# Patient Record
Sex: MALE | Race: WHITE | Employment: PART TIME | URBAN - METROPOLITAN AREA
[De-identification: names, ages, dates, MRNs, and addresses within clinical notes are randomized per-mention and may not be internally consistent; named-entity substitution may affect disease eponyms.]

---
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
## 2024-06-06 DIAGNOSIS — F41.9 ANXIETY AND DEPRESSION: ICD-10-CM

## 2024-06-06 DIAGNOSIS — F32.A ANXIETY AND DEPRESSION: ICD-10-CM

## 2024-06-06 RX ORDER — SERTRALINE HYDROCHLORIDE 100 MG/1
100 TABLET, FILM COATED ORAL DAILY
COMMUNITY
Start: 2024-06-06

## 2024-06-10 RX ORDER — SERTRALINE HYDROCHLORIDE 100 MG/1
100 TABLET, FILM COATED ORAL DAILY
Qty: 5 TABLET | COMMUNITY
Start: 2024-06-10 | End: 2024-06-12

## 2024-06-12 ENCOUNTER — OFFICE VISIT (OUTPATIENT)
Dept: FAMILY MEDICINE | Facility: CLINIC | Age: 19
End: 2024-06-12

## 2024-06-12 VITALS
RESPIRATION RATE: 20 BRPM | HEART RATE: 68 BPM | WEIGHT: 177 LBS | SYSTOLIC BLOOD PRESSURE: 108 MMHG | DIASTOLIC BLOOD PRESSURE: 68 MMHG | TEMPERATURE: 97.6 F | BODY MASS INDEX: 25.4 KG/M2 | OXYGEN SATURATION: 98 %

## 2024-06-12 DIAGNOSIS — Z23 NEED FOR VACCINATION: ICD-10-CM

## 2024-06-12 DIAGNOSIS — F41.9 ANXIETY AND DEPRESSION: Primary | ICD-10-CM

## 2024-06-12 DIAGNOSIS — F32.A ANXIETY AND DEPRESSION: Primary | ICD-10-CM

## 2024-06-12 PROCEDURE — 90715 TDAP VACCINE 7 YRS/> IM: CPT | Performed by: STUDENT IN AN ORGANIZED HEALTH CARE EDUCATION/TRAINING PROGRAM

## 2024-06-12 PROCEDURE — 90471 IMMUNIZATION ADMIN: CPT | Performed by: STUDENT IN AN ORGANIZED HEALTH CARE EDUCATION/TRAINING PROGRAM

## 2024-06-12 PROCEDURE — 99213 OFFICE O/P EST LOW 20 MIN: CPT | Mod: 25 | Performed by: STUDENT IN AN ORGANIZED HEALTH CARE EDUCATION/TRAINING PROGRAM

## 2024-06-12 RX ORDER — SERTRALINE HYDROCHLORIDE 100 MG/1
100 TABLET, FILM COATED ORAL DAILY
Qty: 90 TABLET | Refills: 3 | Status: SHIPPED | OUTPATIENT
Start: 2024-06-12

## 2024-06-12 RX ORDER — SERTRALINE HYDROCHLORIDE 100 MG/1
100 TABLET, FILM COATED ORAL DAILY
Qty: 90 TABLET | Refills: 1 | Status: CANCELLED | OUTPATIENT
Start: 2024-06-12

## 2024-06-12 ASSESSMENT — ANXIETY QUESTIONNAIRES
5. BEING SO RESTLESS THAT IT IS HARD TO SIT STILL: NOT AT ALL
GAD7 TOTAL SCORE: 1
7. FEELING AFRAID AS IF SOMETHING AWFUL MIGHT HAPPEN: NOT AT ALL
2. NOT BEING ABLE TO STOP OR CONTROL WORRYING: NOT AT ALL
1. FEELING NERVOUS, ANXIOUS, OR ON EDGE: SEVERAL DAYS
3. WORRYING TOO MUCH ABOUT DIFFERENT THINGS: NOT AT ALL
GAD7 TOTAL SCORE: 1
6. BECOMING EASILY ANNOYED OR IRRITABLE: NOT AT ALL
IF YOU CHECKED OFF ANY PROBLEMS ON THIS QUESTIONNAIRE, HOW DIFFICULT HAVE THESE PROBLEMS MADE IT FOR YOU TO DO YOUR WORK, TAKE CARE OF THINGS AT HOME, OR GET ALONG WITH OTHER PEOPLE: NOT DIFFICULT AT ALL

## 2024-06-12 ASSESSMENT — PATIENT HEALTH QUESTIONNAIRE - PHQ9
5. POOR APPETITE OR OVEREATING: NOT AT ALL
SUM OF ALL RESPONSES TO PHQ QUESTIONS 1-9: 7

## 2024-06-12 NOTE — PROGRESS NOTES
Assessment & Plan       ICD-10-CM    1. Anxiety and depression  F41.9 sertraline (ZOLOFT) 100 MG tablet    F32.A       2. Need for vaccination  Z23 TDAP VACCINE (Adacel, Boostrix)  [[id number]]           Patient Instructions   Continue current dose    Tetanus shot today    Follow-up anytime if you think meds need to be adjusted or any new concerns      CPE at patient convenience,  reviewed today      Judd Reynolds MD, [facility]      Subjective     Federico Christina is a 18 year old male who presents to clinic today for the following health issues:    HPI   Chief Complaint   Patient presents with    Recheck Medication     Non fasting medication check and review, does have some concerns with current dose and wants to talk about future plans, leaving for college soon       Depression   How are you doing with your depression since your last visit? No change, recently graduated high school. Looking for second job. Still interested in community college this fall.   Are you having other symptoms that might be associated with depression? No  Have you had a significant life event?  No   Are you feeling anxious or having panic attacks?   No  Do you have any concerns with your use of alcohol or other drugs? No    Social History     Tobacco Use    Smoking status: Never    Smokeless tobacco: Never   Substance Use Topics    Alcohol use: Not Currently    Drug use: Not Currently         4/3/2023     5:55 PM 6/12/2024     1:20 PM   PHQ   PHQ-9 Total Score 10 7   Q9: Thoughts of better off dead/self-harm past 2 weeks Not at all Not at all         4/3/2023     5:55 PM 6/12/2024     1:20 PM   LYNNETTE-7 SCORE   Total Score 1 1           Objective    /68 (BP Location: Right arm, Patient Position: Sitting, Cuff Size: Adult Large)   Pulse 68   Temp 97.6  F (36.4  C) (Temporal)   Resp 20   Wt 80.3 kg (177 lb)   SpO2 98%   BMI 25.40 kg/m    Body mass index is 25.4 kg/m .  Alert, NAD  NC/AT  Sclerae  anicteric  Regular  Resp nonlabored  Skin warm and dry  No focal neuro deficits. Speech intact. Normal gait.  Appropriate affect, good insight, no si

## 2024-06-12 NOTE — NURSING NOTE
Chief Complaint   Patient presents with    Recheck Medication     Non fasting medication check and review, does have some concerns with current dose and wants to talk about future plans, leaving for college soon      Pre-visit Screening:  Immunizations:  not up to date - due for tdap  Colonoscopy:  na  Mammogram: na  Asthma Action Test/Plan:  na  PHQ9:  given today   GAD7:  given today   Questioned patient about current smoking habits Pt. has never smoked.  Ok to leave detailed message on voice mail for today's visit only yes, phone # [phone]

## 2024-06-12 NOTE — PATIENT INSTRUCTIONS
Continue current dose    Tetanus shot today    Follow-up anytime if you think meds need to be adjusted or any new concerns

## 2024-06-17 PROBLEM — Z76.89 HEALTH CARE HOME: Status: RESOLVED | Noted: 2023-04-03 | Resolved: 2024-06-17

## 2025-01-17 ENCOUNTER — OFFICE VISIT (OUTPATIENT)
Dept: FAMILY MEDICINE | Facility: CLINIC | Age: 20
End: 2025-01-17

## 2025-01-17 VITALS
SYSTOLIC BLOOD PRESSURE: 110 MMHG | TEMPERATURE: 97 F | WEIGHT: 164 LBS | OXYGEN SATURATION: 99 % | DIASTOLIC BLOOD PRESSURE: 70 MMHG | HEART RATE: 72 BPM | BODY MASS INDEX: 23.53 KG/M2

## 2025-01-17 DIAGNOSIS — F32.A ANXIETY AND DEPRESSION: Primary | ICD-10-CM

## 2025-01-17 DIAGNOSIS — F41.9 ANXIETY AND DEPRESSION: Primary | ICD-10-CM

## 2025-01-17 DIAGNOSIS — F90.9 ATTENTION DEFICIT HYPERACTIVITY DISORDER (ADHD), UNSPECIFIED ADHD TYPE: ICD-10-CM

## 2025-01-17 DIAGNOSIS — Z23 NEED FOR VACCINATION: ICD-10-CM

## 2025-01-17 PROCEDURE — G2211 COMPLEX E/M VISIT ADD ON: HCPCS | Performed by: STUDENT IN AN ORGANIZED HEALTH CARE EDUCATION/TRAINING PROGRAM

## 2025-01-17 PROCEDURE — 90471 IMMUNIZATION ADMIN: CPT | Performed by: STUDENT IN AN ORGANIZED HEALTH CARE EDUCATION/TRAINING PROGRAM

## 2025-01-17 PROCEDURE — 99214 OFFICE O/P EST MOD 30 MIN: CPT | Mod: 25 | Performed by: STUDENT IN AN ORGANIZED HEALTH CARE EDUCATION/TRAINING PROGRAM

## 2025-01-17 PROCEDURE — 90656 IIV3 VACC NO PRSV 0.5 ML IM: CPT | Performed by: STUDENT IN AN ORGANIZED HEALTH CARE EDUCATION/TRAINING PROGRAM

## 2025-01-17 RX ORDER — ATOMOXETINE 40 MG/1
CAPSULE ORAL
Qty: 53 CAPSULE | Refills: 0 | Status: SHIPPED | OUTPATIENT
Start: 2025-01-17 | End: 2025-02-17

## 2025-01-17 ASSESSMENT — ANXIETY QUESTIONNAIRES
3. WORRYING TOO MUCH ABOUT DIFFERENT THINGS: SEVERAL DAYS
IF YOU CHECKED OFF ANY PROBLEMS ON THIS QUESTIONNAIRE, HOW DIFFICULT HAVE THESE PROBLEMS MADE IT FOR YOU TO DO YOUR WORK, TAKE CARE OF THINGS AT HOME, OR GET ALONG WITH OTHER PEOPLE: SOMEWHAT DIFFICULT
GAD7 TOTAL SCORE: 5
GAD7 TOTAL SCORE: 5
7. FEELING AFRAID AS IF SOMETHING AWFUL MIGHT HAPPEN: NOT AT ALL
2. NOT BEING ABLE TO STOP OR CONTROL WORRYING: NOT AT ALL
6. BECOMING EASILY ANNOYED OR IRRITABLE: SEVERAL DAYS
1. FEELING NERVOUS, ANXIOUS, OR ON EDGE: SEVERAL DAYS
5. BEING SO RESTLESS THAT IT IS HARD TO SIT STILL: SEVERAL DAYS

## 2025-01-17 ASSESSMENT — PATIENT HEALTH QUESTIONNAIRE - PHQ9
5. POOR APPETITE OR OVEREATING: SEVERAL DAYS
SUM OF ALL RESPONSES TO PHQ QUESTIONS 1-9: 13

## 2025-01-17 NOTE — PATIENT INSTRUCTIONS
Continue sertraline     Strattera 40mg every morning for 1 week, then if no side effects increase to 80mg daily    Follow-up in 3-4 weeks before needing refill

## 2025-01-17 NOTE — NURSING NOTE
Chief Complaint   Patient presents with    Consult     Concentration concerns. Was on ADHD meds in elementary school. Thinks that it would help with focusing in school and OCD tendencies.     Pre-visit Screening:  Immunizations:  up to date  Colonoscopy:  na  Mammogram: na  Asthma Action Test/Plan:  na  PHQ9:  na  GAD7:  na  Questioned patient about current smoking habits Pt. has never smoked.  Ok to leave detailed message on voice mail for today's visit only yes, phone # [phone] (home)

## 2025-01-17 NOTE — NURSING NOTE
Chief Complaint   Patient presents with    Consult     ADHD/OCD medication. Was on in elementary school. Thinks that it would help with focusing in school and OCD tendencies.     Pre-visit Screening:  Immunizations:  up to date  Colonoscopy:  na  Mammogram: na  Asthma Action Test/Plan:  na  PHQ9:  given today  GAD7:  given today  Questioned patient about current smoking habits Pt. has never smoked.  Ok to leave detailed message on voice mail for today's visit only yes, phone # [phone] (home)

## 2025-01-17 NOTE — PROGRESS NOTES
Assessment & Plan       ICD-10-CM    1. Anxiety and depression  F41.9 Adult Mental Health  Referral - To a [facility] Location (Use POS/Location)    F32.A       2. Attention deficit hyperactivity disorder (ADHD), unspecified ADHD type  F90.9 Adult Mental Health  Referral - To a [facility] Location (Use POS/Location)     DISCONTINUED: atomoxetine (STRATTERA) 40 MG capsule      3. Need for vaccination  Z23 INFLUENZA VACCINE, SPLIT VIRUS, TRIVALENT,PF (FLUZONE\FLUARIX)           Reviewed options of increasing sertraline, adding wellbutrin vs adhd meds, psychology referral  Patient Instructions   Continue sertraline     Strattera 40mg every morning for 1 week, then if no side effects increase to 80mg daily    Follow-up in 3-4 weeks before needing refill     Reasons to follow-up sooner or seek emergent care reviewed.     31 minutes spent on the date of the encounter doing chart review, history and exam, documentation and further activities per the note      Judd Reynolds MD, [facility]      Subjective     Federico Christina is a 19 year old male who presents to clinic today for the following health issues:    HPI   Chief Complaint   Patient presents with    Consult     Concentration concerns. Was on ADHD meds in elementary school. Thinks that it would help with focusing in school and OCD tendencies.      Started community college in the Fall, planning on eventually transferring to 4 year school. Has had difficulty concentrating with studying.    [employer]s bulk records reviewed.   Previous ADHD tx: guanfacine, metadate (irritable).   Previous anxiety tx: sertraline, hydroxzyine (too sleepy).   Prior school dx ASD.   Neuropsychology notes 2015 reviewed, conclusion: MDD with anxious features  Has seen psychologist in the past but didn't find helpful  Reports chronic OCD tendencies, often repetitively counting objects  Stays up late most nights, usually ~6 hrs  sleep/night with naps during day        4/3/2023     5:55 PM 6/12/2024     1:20 PM 1/17/2025    11:18 AM   PHQ   PHQ-9 Total Score 10 7 13   Q9: Thoughts of better off dead/self-harm past 2 weeks Not at all Not at all Not at all         4/3/2023     5:55 PM 6/12/2024     1:20 PM 1/17/2025    11:18 AM   LYNNETTE-7 SCORE   Total Score 1 1 5           Objective    /70 (BP Location: Left arm, Patient Position: Sitting, Cuff Size: Adult Regular)   Pulse 72   Temp 97  F (36.1  C) (Temporal)   Wt 74.4 kg (164 lb)   SpO2 99%   BMI 23.53 kg/m    Body mass index is 23.53 kg/m .  Alert, NAD  NC/AT  Sclerae anicteric  Regular  Resp nonlabored  Skin warm and dry  No focal neuro deficits. Speech intact. Normal gait.  Appropriate affect, no si/hi       Labs reviewed.

## 2025-02-08 DIAGNOSIS — F90.9 ATTENTION DEFICIT HYPERACTIVITY DISORDER (ADHD), UNSPECIFIED ADHD TYPE: ICD-10-CM

## 2025-02-10 RX ORDER — ATOMOXETINE 40 MG/1
CAPSULE ORAL
COMMUNITY
Start: 2025-02-10

## 2025-02-10 NOTE — TELEPHONE ENCOUNTER
Pt is requesting refills of:  Refused Prescriptions:                       Disp   Refills    atomoxetine (STRATTERA) 40 MG capsule [Pha*159 ca*1        Sig: Take 1 capsule (40 mg) by mouth daily for 7 days,           THEN 2 capsules (80 mg) daily for 23 days.    Pt was last seen 1/17/25 and advised to follow up in 3-4 weeks for refills. Pt due for OV.

## 2025-02-17 ENCOUNTER — OFFICE VISIT (OUTPATIENT)
Dept: FAMILY MEDICINE | Facility: CLINIC | Age: 20
End: 2025-02-17

## 2025-02-17 VITALS
DIASTOLIC BLOOD PRESSURE: 84 MMHG | OXYGEN SATURATION: 97 % | HEART RATE: 88 BPM | WEIGHT: 162 LBS | BODY MASS INDEX: 23.24 KG/M2 | SYSTOLIC BLOOD PRESSURE: 120 MMHG

## 2025-02-17 DIAGNOSIS — F32.A ANXIETY AND DEPRESSION: ICD-10-CM

## 2025-02-17 DIAGNOSIS — F84.0 HISTORY OF AUTISM SPECTRUM DISORDER: ICD-10-CM

## 2025-02-17 DIAGNOSIS — F90.9 ATTENTION DEFICIT HYPERACTIVITY DISORDER (ADHD), UNSPECIFIED ADHD TYPE: Primary | ICD-10-CM

## 2025-02-17 DIAGNOSIS — F41.9 ANXIETY AND DEPRESSION: ICD-10-CM

## 2025-02-17 PROCEDURE — 99214 OFFICE O/P EST MOD 30 MIN: CPT | Performed by: STUDENT IN AN ORGANIZED HEALTH CARE EDUCATION/TRAINING PROGRAM

## 2025-02-17 PROCEDURE — G2211 COMPLEX E/M VISIT ADD ON: HCPCS | Performed by: STUDENT IN AN ORGANIZED HEALTH CARE EDUCATION/TRAINING PROGRAM

## 2025-02-17 RX ORDER — ATOMOXETINE 100 MG/1
100 CAPSULE ORAL DAILY
Qty: 30 CAPSULE | Refills: 0 | Status: SHIPPED | OUTPATIENT
Start: 2025-02-17 | End: 2025-03-19

## 2025-02-17 NOTE — PROGRESS NOTES
Assessment & Plan     1. Attention deficit hyperactivity disorder (ADHD), unspecified ADHD type (Primary)  R/b increasing strattera reviewed. Pt to call or send Beam. message with update within one month. We also discussed psychiatry consult and patient in agreement, depending how long it takes to get in we may consider next steps below  - atomoxetine (STRATTERA) 100 MG capsule; Take 1 capsule (100 mg) by mouth daily.  Dispense: 30 capsule; Refill: 0  - Adult Mental Health  Referral - To a [facility] Location (Use POS/Location); Future    2. History of autism spectrum disorder  Noted, prior school dx.   3. Anxiety and depression  Continue current dose sertraline    Patient Instructions   Increase strattera to 100mg daily - please keep me updated over next few weeks    If no improvement in 1 month, can try wellbutrin or consult with psychiatry    [facility] [address]  [address]  [phone] - appt line     Reasons to follow-up sooner or seek emergent care reviewed.     Judd Reynolds MD, [facility]      Subjective     Federico Christina is a 19 year old male who presents to clinic today for the following health issues:    HPI   Chief Complaint   Patient presents with    Recheck Medication     Non fasting medication check and review for strattera, patient has been taking 2 40 mg capsules and feels that she has not noticed much of a difference, feels he wants to try and increase in dose or a different medication in general, has not noticed any alteration in any daily life activities       Started strattera 40mg 1/18, increased to 80mg after 1 week. No benefit or adverse effects appreciated yet. Mood stable, no change.        4/3/2023     5:55 PM 6/12/2024     1:20 PM 1/17/2025    11:18 AM   PHQ   PHQ-9 Total Score 10 7 13   Q9: Thoughts of better off dead/self-harm past 2 weeks Not at all Not at all Not  at all         4/3/2023     5:55 PM 6/12/2024     1:20 PM 1/17/2025    11:18 AM   LYNNETTE-7 SCORE   Total Score 1 1 5             Objective    /84 (BP Location: Left arm, Patient Position: Sitting, Cuff Size: Adult Large)   Pulse 88   Wt 73.5 kg (162 lb)   SpO2 97%   BMI 23.24 kg/m    Body mass index is 23.24 kg/m .  Alert, NAD  NC/AT  Sclerae anicteric  Regular  Resp nonlabored  Skin warm and dry  No focal neuro deficits. Speech intact. Normal gait.  Appropriate affect       Labs reviewed.

## 2025-02-17 NOTE — NURSING NOTE
Chief Complaint   Patient presents with    Recheck Medication     Non fasting medication check and review for strattera, patient has been taking 2 40 mg capsules and feels that she has not noticed much of a difference, feels he wants to try and increase in dose or a different medication in general, has not noticed any alteration in any daily life activities      Pre-visit Screening:  Immunizations:  not up to date - due for meningitis vaccine   Colonoscopy:  na  Mammogram: na  Asthma Action Test/Plan:  na  PHQ9:  na-pt states no concerns with this and sertraline is working well for him   GAD7:  na  Questioned patient about current smoking habits Pt. has never smoked.  Ok to leave detailed message on voice mail for today's visit only yes, phone # [phone] (home)

## 2025-02-17 NOTE — PATIENT INSTRUCTIONS
Increase strattera to 100mg daily - please keep me updated over next few weeks    If no improvement in 1 month, can try wellbutrin or consult with psychiatry    [facility]  [facility]  [address]  [id number] [phone] - appt line

## 2025-03-11 DIAGNOSIS — F90.9 ATTENTION DEFICIT HYPERACTIVITY DISORDER (ADHD), UNSPECIFIED ADHD TYPE: ICD-10-CM

## 2025-03-11 RX ORDER — ATOMOXETINE 100 MG/1
100 CAPSULE ORAL DAILY
COMMUNITY
Start: 2025-03-11

## 2025-03-11 NOTE — TELEPHONE ENCOUNTER
Pt is requesting refills of:  Refused Prescriptions:                       Disp   Refills    atomoxetine (STRATTERA) 100 MG capsule [Ph*90 cap*1        Sig: TAKE 1 CAPSULE BY MOUTH EVERY DAY    Pt seen 2/17/25. Pt to call or send wiseri message with update within one month for more refills. [employer] sent.

## 2025-07-01 DIAGNOSIS — F32.A ANXIETY AND DEPRESSION: ICD-10-CM

## 2025-07-01 DIAGNOSIS — F41.9 ANXIETY AND DEPRESSION: ICD-10-CM

## 2025-07-01 RX ORDER — SERTRALINE HYDROCHLORIDE 100 MG/1
100 TABLET, FILM COATED ORAL DAILY
COMMUNITY
Start: 2025-07-01

## 2025-07-01 NOTE — TELEPHONE ENCOUNTER
Federico Christina is requesting a refill of:    Refused Prescriptions:                       Disp   Refills    sertraline (ZOLOFT) 100 MG tablet [Pharmac*                Sig: TAKE 1 TABLET BY MOUTH EVERY DAY  Refused By: SEVERIANO BOLDEN  Reason for Refusal: Originating/Specialty Provider to approve  Reason for Refusal Comment: Future refills from psychiatry    Pt was referred to psychiatrist on 02/17/25

## 2025-08-05 ENCOUNTER — OFFICE VISIT (OUTPATIENT)
Dept: FAMILY MEDICINE | Facility: CLINIC | Age: 20
End: 2025-08-05

## 2025-08-05 VITALS
TEMPERATURE: 97.5 F | WEIGHT: 167.8 LBS | DIASTOLIC BLOOD PRESSURE: 70 MMHG | OXYGEN SATURATION: 98 % | SYSTOLIC BLOOD PRESSURE: 112 MMHG | HEART RATE: 62 BPM | BODY MASS INDEX: 24.08 KG/M2

## 2025-08-05 DIAGNOSIS — F90.9 ATTENTION DEFICIT HYPERACTIVITY DISORDER (ADHD), UNSPECIFIED ADHD TYPE: ICD-10-CM

## 2025-08-05 DIAGNOSIS — F32.A ANXIETY AND DEPRESSION: Primary | ICD-10-CM

## 2025-08-05 DIAGNOSIS — Z23 NEED FOR VACCINATION: ICD-10-CM

## 2025-08-05 DIAGNOSIS — F41.9 ANXIETY AND DEPRESSION: Primary | ICD-10-CM

## 2025-08-05 PROCEDURE — 90471 IMMUNIZATION ADMIN: CPT | Performed by: STUDENT IN AN ORGANIZED HEALTH CARE EDUCATION/TRAINING PROGRAM

## 2025-08-05 PROCEDURE — G2211 COMPLEX E/M VISIT ADD ON: HCPCS | Performed by: STUDENT IN AN ORGANIZED HEALTH CARE EDUCATION/TRAINING PROGRAM

## 2025-08-05 PROCEDURE — 99214 OFFICE O/P EST MOD 30 MIN: CPT | Mod: 25 | Performed by: STUDENT IN AN ORGANIZED HEALTH CARE EDUCATION/TRAINING PROGRAM

## 2025-08-05 PROCEDURE — 90651 9VHPV VACCINE 2/3 DOSE IM: CPT | Performed by: STUDENT IN AN ORGANIZED HEALTH CARE EDUCATION/TRAINING PROGRAM

## 2025-08-05 RX ORDER — LISDEXAMFETAMINE DIMESYLATE 30 MG/1
30 CAPSULE ORAL DAILY
Qty: 30 CAPSULE | Refills: 0 | Status: SHIPPED | OUTPATIENT
Start: 2025-10-04 | End: 2025-11-03

## 2025-08-05 RX ORDER — LISDEXAMFETAMINE DIMESYLATE 30 MG/1
30 CAPSULE ORAL DAILY
Qty: 30 CAPSULE | Refills: 0 | Status: SHIPPED | OUTPATIENT
Start: 2025-08-05 | End: 2025-09-04

## 2025-08-05 RX ORDER — LISDEXAMFETAMINE DIMESYLATE 30 MG/1
30 CAPSULE ORAL DAILY
Qty: 30 CAPSULE | Refills: 0 | Status: SHIPPED | OUTPATIENT
Start: 2025-09-04 | End: 2025-10-04

## 2025-08-05 RX ORDER — SERTRALINE HYDROCHLORIDE 100 MG/1
100 TABLET, FILM COATED ORAL DAILY
Qty: 90 TABLET | Refills: 3 | Status: SHIPPED | OUTPATIENT
Start: 2025-08-05

## 2025-08-05 ASSESSMENT — PATIENT HEALTH QUESTIONNAIRE - PHQ9
SUM OF ALL RESPONSES TO PHQ QUESTIONS 1-9: 11
5. POOR APPETITE OR OVEREATING: SEVERAL DAYS

## 2025-08-05 ASSESSMENT — ANXIETY QUESTIONNAIRES
7. FEELING AFRAID AS IF SOMETHING AWFUL MIGHT HAPPEN: NOT AT ALL
GAD7 TOTAL SCORE: 5
2. NOT BEING ABLE TO STOP OR CONTROL WORRYING: SEVERAL DAYS
6. BECOMING EASILY ANNOYED OR IRRITABLE: SEVERAL DAYS
3. WORRYING TOO MUCH ABOUT DIFFERENT THINGS: SEVERAL DAYS
IF YOU CHECKED OFF ANY PROBLEMS ON THIS QUESTIONNAIRE, HOW DIFFICULT HAVE THESE PROBLEMS MADE IT FOR YOU TO DO YOUR WORK, TAKE CARE OF THINGS AT HOME, OR GET ALONG WITH OTHER PEOPLE: SOMEWHAT DIFFICULT
1. FEELING NERVOUS, ANXIOUS, OR ON EDGE: SEVERAL DAYS
5. BEING SO RESTLESS THAT IT IS HARD TO SIT STILL: NOT AT ALL
GAD7 TOTAL SCORE: 5

## 2025-08-19 DIAGNOSIS — F90.9 ATTENTION DEFICIT HYPERACTIVITY DISORDER (ADHD), UNSPECIFIED ADHD TYPE: ICD-10-CM

## 2025-08-19 RX ORDER — LISDEXAMFETAMINE DIMESYLATE 30 MG/1
30 CAPSULE ORAL DAILY
Qty: 30 CAPSULE | Refills: 0 | Status: SHIPPED | OUTPATIENT
Start: 2025-08-19